# Patient Record
Sex: MALE | Race: WHITE | Employment: FULL TIME | URBAN - METROPOLITAN AREA
[De-identification: names, ages, dates, MRNs, and addresses within clinical notes are randomized per-mention and may not be internally consistent; named-entity substitution may affect disease eponyms.]

---

## 2024-04-26 DIAGNOSIS — Z13.9 SCREENING DUE: Primary | ICD-10-CM

## 2025-02-07 ENCOUNTER — OFFICE VISIT (OUTPATIENT)
Dept: PRIMARY CARE | Facility: CLINIC | Age: 65
End: 2025-02-07
Payer: COMMERCIAL

## 2025-02-07 VITALS
DIASTOLIC BLOOD PRESSURE: 80 MMHG | SYSTOLIC BLOOD PRESSURE: 120 MMHG | WEIGHT: 196 LBS | OXYGEN SATURATION: 96 % | HEART RATE: 94 BPM | BODY MASS INDEX: 29.03 KG/M2 | HEIGHT: 69 IN

## 2025-02-07 DIAGNOSIS — Z00.00 ENCOUNTER FOR ROUTINE ADULT HEALTH EXAMINATION WITHOUT ABNORMAL FINDINGS: Primary | ICD-10-CM

## 2025-02-07 DIAGNOSIS — E78.2 MIXED HYPERLIPIDEMIA: ICD-10-CM

## 2025-02-07 DIAGNOSIS — F90.9 ATTENTION DEFICIT HYPERACTIVITY DISORDER (ADHD), UNSPECIFIED ADHD TYPE: ICD-10-CM

## 2025-02-07 PROCEDURE — 99396 PREV VISIT EST AGE 40-64: CPT

## 2025-02-07 PROCEDURE — 3008F BODY MASS INDEX DOCD: CPT

## 2025-02-07 PROCEDURE — 1036F TOBACCO NON-USER: CPT

## 2025-02-07 RX ORDER — METHYLPHENIDATE HYDROCHLORIDE 36 MG/1
36 TABLET ORAL DAILY
Qty: 90 TABLET | Refills: 0 | Status: SHIPPED | OUTPATIENT
Start: 2025-02-07

## 2025-02-07 RX ORDER — METHYLPHENIDATE HYDROCHLORIDE 10 MG/1
10 TABLET ORAL 2 TIMES DAILY
Qty: 180 TABLET | Refills: 0 | Status: SHIPPED | OUTPATIENT
Start: 2025-02-07

## 2025-02-07 RX ORDER — METHYLPHENIDATE HYDROCHLORIDE 10 MG/1
10 TABLET ORAL 2 TIMES DAILY
COMMUNITY
End: 2025-02-07 | Stop reason: SDUPTHER

## 2025-02-07 RX ORDER — METHYLPHENIDATE HYDROCHLORIDE 36 MG/1
36 TABLET ORAL DAILY
COMMUNITY
End: 2025-02-07 | Stop reason: SDUPTHER

## 2025-02-07 ASSESSMENT — PATIENT HEALTH QUESTIONNAIRE - PHQ9
2. FEELING DOWN, DEPRESSED OR HOPELESS: NOT AT ALL
1. LITTLE INTEREST OR PLEASURE IN DOING THINGS: NOT AT ALL
SUM OF ALL RESPONSES TO PHQ9 QUESTIONS 1 AND 2: 0

## 2025-02-07 NOTE — ASSESSMENT & PLAN NOTE
Complete history and physical examination was performed  Colonoscopy UTD, due again 2026  CT Calcium score ordered  Immunizations UTD  Written labs for CBC, CMP, Lipids, PSA provided to patient. We will notify of test results once available and make treatment recommendations accordingly

## 2025-02-07 NOTE — PROGRESS NOTES
"  Subjective   Patient ID: Jozef Jay is a 65 y.o. male who presents for Annual Exam.         Reviewed all medications by prescribing practitioner or clinical pharmacist (such as prescriptions, OTCs, herbal therapies and supplements) and documented in the medical record.    HPI  1.  Physical exam.  Colonoscopy: rectal biopsy in chart showed hyperplastic polyp in 12/2024, w/3 year surveillance  CT Calcium score 0 in 2/25/2020, can repeat now  Immunizations: UTD    Social Hx:  Smoking - none  Alcohol - none  Diet - well balanced  Exercise - runs regularly  Could sleep better    2. ADHD  Ritalin and Concerta  Requesting refills    3. Allergic rhinitis  Flonase OTC, works well    4. Snoring  No apneas, O2 saturation high 90s  Doing well, no daytime sleepiness or HTN    Review of Systems  All pertinent positive symptoms are included in the history of present illness.    All other systems have been reviewed and are negative and noncontributory to this patient's current ailments.    No past medical history on file.  No past surgical history on file.  Social History     Tobacco Use    Smoking status: Never    Smokeless tobacco: Never     No family history on file.  Immunization History   Administered Date(s) Administered    Moderna SARS-CoV-2 Vaccination 11/06/2021    Pfizer COVID-19 vaccine, 12 years and older, (30mcg/0.3mL) (Comirnaty) 12/01/2023, 11/22/2024    Pfizer Purple Cap SARS-CoV-2 09/06/2022     Current Outpatient Medications   Medication Instructions    methylphenidate (RITALIN) 10 mg, oral, 2 times daily    methylphenidate ER 36 mg, oral, Daily     No Known Allergies    Objective   Vitals:    02/07/25 1111   BP: 120/80   Pulse: 94   SpO2: 96%   Weight: 88.9 kg (196 lb)   Height: 1.753 m (5' 9\")     Body mass index is 28.94 kg/m².    BP Readings from Last 3 Encounters:   02/07/25 120/80      Wt Readings from Last 3 Encounters:   02/07/25 88.9 kg (196 lb)        No visits with results within 1 Month(s) from " this visit.   Latest known visit with results is:   Legacy Encounter on 07/19/2023   Component Date Value    Specimen Source 07/19/2023                      Value:NASOPHARYNGEAL  Performed at [address] [address]      Sars-Cov-2 07/19/2023 Positive for COVID-19 (A)     Note 07/19/2023                      Value:  This test has been authorized by FDA under an Emergency Use   Authorization (EUA).    Negative test results do not preclude infection with these viruses   since the adequacy of sample collection or low viral burden may   impact the clinical sensitivity of this test method.    This test is only authorized for the duration of the declaration that   circumstances exist justifying the authorization of emergency use of   in vitro diagnostic tests for the detection and/or diagnosis of   COVID-19, unless the authorization is terminated or revoked sooner.    All testing performed by PCR.  Performed at [facility], [place], operated by [facility],[address] [place] [id number]       Physical Exam  CONSTITUTIONAL - well nourished, well developed, looks like stated age, in no acute distress, not ill-appearing, and not tired appearing  SKIN - normal skin color and pigmentation, normal skin turgor without rash, lesions, or nodules visualized  HEAD - no trauma, normocephalic  EYES - pupils are equal and reactive to light, extraocular muscles are intact, and normal external exam  ENT - TM's intact, no injection, no signs of infection, uvula midline, normal tongue movement and throat normal  NECK - supple without rigidity, no neck mass was observed, no thyromegaly or thyroid nodules  CHEST - clear to auscultation, no wheezing, no crackles and no rales, good effort  CARDIAC - regular rate and regular rhythm, no skipped beats, no murmur  ABDOMEN - no organomegaly, soft, nontender, nondistended, normal bowel sounds, no guarding/rebound/rigidity  EXTREMITIES - no obvious or evident edema, no obvious or evident  deformities  NEUROLOGICAL - normal gait, normal balance, normal motor, no ataxia; alert, oriented and no focal signs  PSYCHIATRIC - alert, pleasant and cordial, age-appropriate  IMMUNOLOGIC - no cervical lymphadenopathy    Assessment/Plan   Problem List Items Addressed This Visit       Encounter for routine adult health examination without abnormal findings - Primary     Complete history and physical examination was performed  Colonoscopy UTD, due again 2026  CT Calcium score ordered  Immunizations UTD  Written labs for CBC, CMP, Lipids, PSA provided to patient. We will notify of test results once available and make treatment recommendations accordingly         Attention deficit hyperactivity disorder (ADHD)     Stable, current regimen working well. Printed prescription given to patient.          Relevant Medications    methylphenidate (Ritalin) 10 mg tablet    methylphenidate ER 36 mg extended release tablet     Other Visit Diagnoses       Mixed hyperlipidemia        Relevant Orders    CT cardiac scoring wo IV contrast

## 2025-02-07 NOTE — PROGRESS NOTES
I reviewed and examined the patient. I was present for the key exam elements, and I fully participated in the patient's care. I discussed the management of the care with the resident. I have personally reviewed the pertinent labs and imaging, as well as recent notes, with the patient. I have reviewed the note above and agree with the resident's medical decision making as documented in the resident's note, in addition to the following comments / findings:     Agree with the rest of the plan outlined below by resident physician. No red flags.      The patient understands and agrees to the assessment and plan of care. Patient has also agreed to follow up and comply with the treatment and evaluation as recommended today. Patient was instructed to call the office at [phone] should questions arise regarding their treatment or care.     Ronn Kirk DO, FAOASM  Family Medicine   [address], [address]  [place] [address]     Ronn Kirk DO

## 2025-05-18 ENCOUNTER — HOSPITAL ENCOUNTER (OUTPATIENT)
Dept: RADIOLOGY | Facility: HOSPITAL | Age: 65
Discharge: HOME | End: 2025-05-18
Payer: COMMERCIAL

## 2025-05-18 DIAGNOSIS — E78.2 MIXED HYPERLIPIDEMIA: ICD-10-CM

## 2025-05-18 PROCEDURE — 75571 CT HRT W/O DYE W/CA TEST: CPT
